# Patient Record
Sex: FEMALE
[De-identification: names, ages, dates, MRNs, and addresses within clinical notes are randomized per-mention and may not be internally consistent; named-entity substitution may affect disease eponyms.]

---

## 2019-01-12 ENCOUNTER — HOSPITAL ENCOUNTER (EMERGENCY)
Dept: HOSPITAL 88 - FSED | Age: 29
Discharge: HOME | End: 2019-01-12
Payer: COMMERCIAL

## 2019-01-12 VITALS — HEIGHT: 62 IN | WEIGHT: 150 LBS | BODY MASS INDEX: 27.6 KG/M2

## 2019-01-12 VITALS — DIASTOLIC BLOOD PRESSURE: 69 MMHG | SYSTOLIC BLOOD PRESSURE: 137 MMHG

## 2019-01-12 DIAGNOSIS — R11.2: ICD-10-CM

## 2019-01-12 DIAGNOSIS — K21.9: ICD-10-CM

## 2019-01-12 DIAGNOSIS — K29.00: ICD-10-CM

## 2019-01-12 DIAGNOSIS — R10.13: Primary | ICD-10-CM

## 2019-01-12 PROCEDURE — 80076 HEPATIC FUNCTION PANEL: CPT

## 2019-01-12 PROCEDURE — 81025 URINE PREGNANCY TEST: CPT

## 2019-01-12 PROCEDURE — 81003 URINALYSIS AUTO W/O SCOPE: CPT

## 2019-01-12 PROCEDURE — 99284 EMERGENCY DEPT VISIT MOD MDM: CPT

## 2019-01-12 PROCEDURE — 85025 COMPLETE CBC W/AUTO DIFF WBC: CPT

## 2019-01-12 PROCEDURE — 80048 BASIC METABOLIC PNL TOTAL CA: CPT

## 2019-01-12 NOTE — XMS REPORT
Clinical Summary

                             Created on: 2019



Kiah Woodward

External Reference #: [id number]

: 1990

Sex: Female



Demographics







                          Address                   [address] [place]  [address]

 

                          Home Phone                [phone]

 

                          Preferred Language        English

 

                          Marital Status            Unknown

 

                          Yazidism Affiliation     Anglican

 

                          Race                      Unknown

 

                          Ethnic Group              /Latin





Author







                          Author                    [facility]

 

                          Address                   Unknown

 

                          Phone                     Unavailable







Support







                Name            Relationship    Address         Phone

 

                    Kiah Woodward    ECON                [address] [place]  [address]                     [phone]







Care Team Providers







                    Care Team Member Name    Role                Phone

 

                    Sharpless           PCP                 [phone]







Allergies







                                        Comments



                 Active Allergy     Reactions       Severity        Noted Date 

 

                                        



DIARRHEA



                     [place]              Other (See          2016 



                                         Comments)   







Medications







                          End Date                  Status



              Medication     Sig          Dispensed     Refills      Start  



                                         Date  

 

                                                    Active



                 fexofenadine-pseudoephedr     1 tablet.       0                 



                           ine (ALLEGRA-D 24 HOUR)        6  



                                         180-240 mg per 24 hr      



                                         tablet      

 

                                                    Active



                 topiramate (TOPAMAX) 50     TAKE 1 TABLET      0                 



                     MG tablet           BY MOUTH 2          7  



                                         TIMES DAILY.     

 

                          2018                



              naproxen (EC NAPROSYN)     Take 1 tablet     20 tablet     0              



                     500 MG EC tablet     (500 mg             7  



                                         total) by     



                                         mouth 2 (two)     



                                         times daily     



                                         with     



                                         breakfast and     



                                         dinner.     







Active Problems







 



                           Problem                   Noted Date

 

 



                           Migraine                  2015

 

 



                           Right sided weakness      2015

 

 



                           Headache                  2015







Family History







   



                 Medical History     Relation        Name            Comments

 

   



                           Unremarkable              Father  

 

   



                           Unremarkable              Mother  









   



                 Relation        Name            Status          Comments

 

   



                                         Father   

 

   



                                         Mother   







Social History







                                        Date



                 Tobacco Use     Types           Packs/Day       Years Used 

 

                                         



                                         Never Smoker    









   



                 Alcohol Use     Drinks/Week     oz/Week         Comments

 

   



                                         No   









 



                           Sex Assigned at Birth     Date Recorded

 

 



                                         Not on file 









                                        Industry



                           Job Start Date            Occupation 

 

                                        Not on file



                           Not on file               Not on file 









                                        Travel End



                           Travel History            Travel Start 

 





                                         No recent travel history available.







Last Filed Vital Signs

Not on file



Plan of Treatment





Not on file



Results

Not on fileafter 2018



Insurance







     



            Payer      Benefit     Subscriber ID     Type       Phone      Address



                                         Plan /    



                                         Group    

 

     



                 [employer]     [employer]           xxxxxxxxxxx     HMO/POS  



                                         HMO/POS/OP    



                                         EN ACCESS    









     



            Guarantor Name     Account     Relation to     Date of     Phone      Billing Address



                     Type                Patient             Birth  

 

     



            Kiah Woodward     Personal/F     Self       1990     [phone]     [address]



                     naima               (Home)              [place] [address]







Advance Directives





For more information, please contact:



[facility]



[address]



[place] [address] [phone]









                          Date Inactivated          Comments



                           Code Status               Date Activated  

 

                          2015  4:35 PM        



                           Full Code                 2015  8:05 PM  









  



                           This code status was determined by:     Patient

## 2019-01-12 NOTE — XMS REPORT
Patient Summary Document

                             Created on: 2019



JEWELL ROMERO

External Reference #: [id number]

: 1990

Sex: Female



Demographics







                          Address                   [address] [place] [address]  [address]

 

                          Home Phone                [phone]

 

                          Preferred Language        Unknown

 

                          Marital Status            Unknown

 

                          Shinto Affiliation     Unknown

 

                          Race                      Unknown

 

                          Ethnic Group              Unknown





Author







                          Author                    [facility]

 

                          Address                   Unknown

 

                          Phone                     Unavailable







Care Team Providers







                    Care Team Member Name    Role                Phone

 

                    MARIUM FALCON    Unavailable         Unavailable







Problems

This patient has no known problems.



Allergies, Adverse Reactions, Alerts

This patient has no known allergies or adverse reactions.



Medications

This patient has no known medications.



Results







           Test Description    Test Time    Test Comments    Text Results    Atomic Results    Result

 Comments

 

                COMPREHENSIVE METABOLIC PANEL    2017 21:15:00                      

 

   

 

                TOTAL PROTEIN (BEAKER) (test code=770)    8.1 gm/dL       6.0-8.5          

 

                ALBUMIN (BEAKER) (test code=1145)    4.5 g/dL        3.5-5.0          

 

                ALKALINE PHOSPHATASE (BEAKER) (test code=346)    84 U/L                     

 

                BILIRUBIN TOTAL (BEAKER) (test code=377)    0.2 mg/dL       0.1-1.2          

 

                SODIUM (BEAKER) (test code=381)    142 meq/L       135-148          

 

                POTASSIUM (BEAKER) (test code=379)    4.1 meq/L       3.6-5.5          

 

                CHLORIDE (BEAKER) (test code=382)    102 meq/L                  

 

                CO2 (BEAKER) (test code=355)    25 meq/L        24-32            

 

                BLOOD UREA NITROGEN (BEAKER) (test code=354)    14 mg/dL        10-26            

 

                CREATININE (BEAKER) (test code=358)    0.58 mg/dL      0.50-1.20        

 

                GLUCOSE RANDOM (BEAKER) (test code=652)    91 mg/dL                   

 

                CALCIUM (BEAKER) (test code=697)    8.8 mg/dL       8.5-10.5         

 

                AST (SGOT) (BEAKER) (test code=353)    23 U/L          5-40             

 

                ALT (SGPT) (BEAKER) (test code=347)    17 U/L          5-50             

 

                EGFR (BEAKER) (test code=1092)    128 mL/min/1.73 sq m                    ESTIMATED GFR IS NOT AS

 ACCURATE AS CREATININE CLEARANCE IN PREDICTING GLOMERULAR FILTRATION RATE. 
ESTIMATED GFR IS NOT APPLICABLE FOR DIALYSIS PATIENTS.





[id number] 21:15:00* 





                Test Item       Value           Reference Range    Comments

 

                LIPASE (BEAKER) (test code=749)    168 U/L                    





CBC W/PLT COUNT & AUTO [id number] 21:08:00* 





                Test Item       Value           Reference Range    Comments

 

                WHITE BLOOD CELL COUNT (BEAKER) (test code=775)    12.8 10e3/ L    4.0-10.0         

 

                RED BLOOD CELL COUNT (BEAKER) (test code=761)    4.68 10e6/ L    4.00-5.00        

 

                HEMOGLOBIN (BEAKER) (test code=410)    14.0 g/dL       12.0-15.0        

 

                HEMATOCRIT (BEAKER) (test code=411)    41.9 %          36.0-45.0        

 

                MEAN CORPUSCULAR VOLUME (BEAKER) (test code=753)    89.6 fL         82.0-99.0        

 

                MEAN CORPUSCULAR HEMOGLOBIN (BEAKER) (test code=751)    29.9 pg         27.0-33.0        

 

                    MEAN CORPUSCULAR HEMOGLOBIN CONC (BEAKER) (test code=752)    33.3 g/dL           32.0-36.0 

                                         

 

                RED CELL DISTRIBUTION WIDTH (BEAKER) (test code=412)    11.9 %          10.3-14.2        

 

                PLATELET COUNT (BEAKER) (test code=756)    395 10e3/ L     150-430          

 

                MEAN PLATELET VOLUME (BEAKER) (test code=754)    6.9 fL          6.5-10.5         

 

                NEUTROPHILS RELATIVE PERCENT (BEAKER) (test code=429)    58 %                             

 

                LYMPHOCYTES RELATIVE PERCENT (BEAKER) (test code=430)    31 %                             

 

                MONOCYTES RELATIVE PERCENT (BEAKER) (test code=431)    8 %                              

 

                EOSINOPHILS RELATIVE PERCENT (BEAKER) (test code=432)    2 %                              

 

                BASOPHILS RELATIVE PERCENT (BEAKER) (test code=437)    1 %                              

 

                NEUTROPHILS ABSOLUTE COUNT (BEAKER) (test code=670)    7.36 10e3/ L    1.80-8.00        

 

                LYMPHOCYTES ABSOLUTE COUNT (BEAKER) (test code=414)    3.99 10e3/ L    1.48-4.50        

 

                MONOCYTES ABSOLUTE COUNT (BEAKER) (test code=415)    1.06 10e3/ L    0.00-1.30        

 

                EOSINOPHILS ABSOLUTE COUNT (BEAKER) (test code=416)    0.23 10e3/ L    0.00-0.50        

 

                BASOPHILS ABSOLUTE COUNT (BEAKER) (test code=417)    0.11 10e3/ L    0.00-0.20        





URINALYSIS W/ [id number] 20:52:00* 





                Test Item       Value           Reference Range    Comments

 

                COLOR (BEAKER) (test code=470)    Yellow                           

 

                CLARITY (BEAKER) (test code=469)    Clear                            

 

                SPECIFIC GRAVITY UA (BEAKER) (test code=468)    1.025           1.001-1.035      

 

                PH UA (BEAKER) (test code=467)    5.5             5.0-8.0          

 

                PROTEIN UA (BEAKER) (test code=464)    Negative        Negative         

 

                GLUCOSE UA (BEAKER) (test code=365)    Negative        Negative         

 

                KETONES UA (BEAKER) (test code=371)    Negative        Negative         

 

                BILIRUBIN UA (BEAKER) (test code=462)    Negative        Negative         

 

                BLOOD UA (BEAKER) (test code=461)    Moderate        Negative         

 

                NITRITE UA (BEAKER) (test code=465)    Negative        Negative         

 

                LEUKOCYTE ESTERASE UA (BEAKER) (test code=466)    Negative        Negative         

 

                UROBILINOGEN UA (BEAKER) (test code=463)    0.2 mg/dL       0.2-1.0          

 

                BACTERIA (BEAKER) (test eesa=167)    Many                             

 

                MUCUS (BEAKER) (test code=1574)    Moderate                         

 

                RBC UA-MANUAL (BEAKER) (test code=1659)    10-20 /HPF                       

 

                WBC UA-MANUAL (BEAKER) (test code=1661)    <5 /HPF                          

 

                SQUAMOUS EPITHELIAL MANUAL (BEAKER) (test code=1663)    5-10 /HPF                        

 

                SOURCE(BEAKER) (test code=2795)                                     





PREGNANCY SCREEN, [id number] 20:49:00* 





                Test Item       Value           Reference Range    Comments

 

                PREGNANCY TEST URINE (BEAKER) (test code=583)    Negative

## 2019-02-06 ENCOUNTER — HOSPITAL ENCOUNTER (EMERGENCY)
Dept: HOSPITAL 88 - FSED | Age: 29
Discharge: HOME | End: 2019-02-06
Payer: COMMERCIAL

## 2019-02-06 VITALS — SYSTOLIC BLOOD PRESSURE: 110 MMHG | DIASTOLIC BLOOD PRESSURE: 71 MMHG

## 2019-02-06 VITALS — BODY MASS INDEX: 27.6 KG/M2 | HEIGHT: 62 IN | WEIGHT: 150 LBS

## 2019-02-06 DIAGNOSIS — R11.0: ICD-10-CM

## 2019-02-06 DIAGNOSIS — G43.019: Primary | ICD-10-CM

## 2019-02-06 PROCEDURE — 99283 EMERGENCY DEPT VISIT LOW MDM: CPT

## 2019-02-06 PROCEDURE — 81025 URINE PREGNANCY TEST: CPT

## 2019-02-06 NOTE — XMS REPORT
Clinical Summary

                             Created on: 2019



Kiah Woodward

External Reference #: [id number]

: 1990

Sex: Female



Demographics







                          Address                   [address] [place]  [address]

 

                          Home Phone                [phone]

 

                          Preferred Language        English

 

                          Marital Status            Unknown

 

                          Muslim Affiliation     Anabaptism

 

                          Race                      Unknown

 

                          Ethnic Group              /Latin





Author







                          Author                    [facility]

 

                          Address                   Unknown

 

                          Phone                     Unavailable







Support







                Name            Relationship    Address         Phone

 

                    Kiah Woodward    EC[employer]                [address] [place]  [address]                     [phone]







Care Team Providers







                    Care Team Member Name    Role                Phone

 

                    Sharpless           PCP                 [phone]







Allergies







                                        Comments



                 Active Allergy     Reactions       Severity        Noted Date 

 

                                        



DIARRHEA



                     [place]              Other (See          2016 



                                         Comments)   







Medications







                          End Date                  Status



              Medication     Sig          Dispensed     Refills      Start  



                                         Date  

 

                                                    Active



                 fexofenadine-pseudoephedr     1 tablet.       0                 



                           ine (ALLEGRA-D 24 HOUR)        6  



                                         180-240 mg per 24 hr      



                                         tablet      

 

                                                    Active



                 topiramate (TOPAMAX) 50     TAKE 1 TABLET      0                 



                     MG tablet           BY MOUTH 2          7  



                                         TIMES DAILY.     

 

                          2018                



              naproxen (EC NAPROSYN)     Take 1 tablet     20 tablet     0              



                     500 MG EC tablet     (500 mg             7  



                                         total) by     



                                         mouth 2 (two)     



                                         times daily     



                                         with     



                                         breakfast and     



                                         dinner.     







Active Problems







 



                           Problem                   Noted Date

 

 



                           Migraine                  2015

 

 



                           Right sided weakness      2015

 

 



                           Headache                  2015







Family History







   



                 Medical History     Relation        Name            Comments

 

   



                           Unremarkable              Father  

 

   



                           Unremarkable              Mother  









   



                 Relation        Name            Status          Comments

 

   



                                         Father   

 

   



                                         Mother   







Social History







                                        Date



                 Tobacco Use     Types           Packs/Day       Years Used 

 

                                         



                                         Never Smoker    









   



                 Alcohol Use     Drinks/Week     oz/Week         Comments

 

   



                                         No   









 



                           Sex Assigned at Birth     Date Recorded

 

 



                                         Not on file 









                                        Industry



                           Job Start Date            Occupation 

 

                                        Not on file



                           Not on file               Not on file 









                                        Travel End



                           Travel History            Travel Start 

 





                                         No recent travel history available.







Last Filed Vital Signs

Not on file



Plan of Treatment





Not on file



Results

Not on fileafter 2018



Insurance







     



            Payer      Benefit     Subscriber ID     Type       Phone      Address



                                         Plan /    



                                         Group    

 

     



                 [employer]     [employer]           xxxxxxxxxxx     HMO/POS  



                                         HMO/POS/OP    



                                         EN ACCESS    









     



            Guarantor Name     Account     Relation to     Date of     Phone      Billing Address



                     Type                Patient             Birth  

 

     



            Kiah Woodward     Personal/F     Self       1990     [phone]     [address]



                     [address]               (Home)              [place] [address]







Advance Directives





For more information, please contact:



[facility]



[address]



[place] [address] [phone]









                          Date Inactivated          Comments



                           Code Status               Date Activated  

 

                          2015  4:35 PM        



                           Full Code                 2015  8:05 PM  









  



                           This code status was determined by:     Patient

## 2019-04-20 ENCOUNTER — HOSPITAL ENCOUNTER (EMERGENCY)
Dept: HOSPITAL 88 - FSED | Age: 29
Discharge: HOME | End: 2019-04-20
Payer: COMMERCIAL

## 2019-04-20 VITALS — BODY MASS INDEX: 27.6 KG/M2 | WEIGHT: 150 LBS | HEIGHT: 62 IN

## 2019-04-20 DIAGNOSIS — T15.11XA: Primary | ICD-10-CM

## 2019-04-20 PROCEDURE — 99284 EMERGENCY DEPT VISIT MOD MDM: CPT

## 2019-04-20 NOTE — XMS REPORT
Clinical Summary

                             Created on: 2019



Kiah Woodward

External Reference #: [id number]

: 1990

Sex: Female



Demographics







                          Address                   [address] [place]  [address]

 

                          Home Phone                [phone]

 

                          Preferred Language        English

 

                          Marital Status            Unknown

 

                          Christianity Affiliation     Oriental orthodox

 

                          Race                      Unknown

 

                          Ethnic Group              /Latin





Author







                          Author                    [facility]

 

                          Address                   Unknown

 

                          Phone                     Unavailable







Support







                Name            Relationship    Address         Phone

 

                    Kiah Woodward    ECON                [address]

[place]  [address]                     [phone]







Care Team Providers







                    Care Team Member Name    Role                Phone

 

                    Sharpless           PCP                 [phone]







Allergies







                                        Comments



                 Active Allergy     Reactions       Severity        Noted Date 

 

                                        



DIARRHEA



                     [place]              Other (See          2016 



                                         Comments)   







Medications







                          End Date                  Status



              Medication     Sig          Dispensed     Refills      Start  



                                         Date  

 

                                                    Active



                 fexofenadine-pseudoephedr     1 tablet.       0                 



                           ine (ALLEGRA-D 24 HOUR)        6  



                                         180-240 mg per 24 hr      



                                         tablet      

 

                                                    Active



                 topiramate (TOPAMAX) 50     TAKE 1 TABLET      0                 



                     MG tablet           BY MOUTH 2          7  



                                         TIMES DAILY.     







Active Problems







 



                           Problem                   Noted Date

 

 



                           Migraine                  2015

 

 



                           Right sided weakness      2015

 

 



                           Headache                  2015







Family History







   



                 Medical History     Relation        Name            Comments

 

   



                           Unremarkable              Father  

 

   



                           Unremarkable              Mother  









   



                 Relation        Name            Status          Comments

 

   



                                         Father   

 

   



                                         Mother   







Social History







                                        Date



                 Tobacco Use     Types           Packs/Day       Years Used 

 

                                         



                                         Never Smoker    









   



                 Alcohol Use     Drinks/Week     oz/Week         Comments

 

   



                                         No   









 



                           Sex Assigned at Birth     Date Recorded

 

 



                                         Not on file 









                                        Industry



                           Job Start Date            Occupation 

 

                                        Not on file



                           Not on file               Not on file 









                                        Travel End



                           Travel History            Travel Start 

 





                                         No recent travel history available.







Last Filed Vital Signs

Not on file



Plan of Treatment





Not on file



Results

Not on fileafter 2018



Insurance







     



            Payer      Benefit     Subscriber ID     Type       Phone      Address



                                         Plan /    



                                         Group    

 

     



                 [employer]     [employer]           xxxxxxxxxxx     HMO/POS  



                                         HMO/POS/OP    



                                         EN ACCESS    









     



            Guarantor Name     Account     Relation to     Date of     Phone      Billing Address



                     Type                Patient             Birth  

 

     



            Kiah Woodward     Personal/F     Self       1990     [phone]     [address] [place]               (Home)              [place] [address]







Advance Directives





For more information, please contact:



[place] [id number] [facility] [place] [address]



[phone]









                          Date Inactivated          Comments



                           Code Status               Date Activated  

 

                          2015  4:35 PM        



                           Full Code                 2015  8:05 PM  









  



                           This code status was determined by:     Patient

## 2020-08-30 ENCOUNTER — HOSPITAL ENCOUNTER (EMERGENCY)
Dept: HOSPITAL 88 - FSED | Age: 30
Discharge: HOME | End: 2020-08-30
Payer: SELF-PAY

## 2020-08-30 VITALS — BODY MASS INDEX: 30.12 KG/M2 | WEIGHT: 170 LBS | HEIGHT: 63 IN

## 2020-08-30 DIAGNOSIS — F17.210: ICD-10-CM

## 2020-08-30 DIAGNOSIS — G43.909: Primary | ICD-10-CM

## 2020-08-30 DIAGNOSIS — K21.9: ICD-10-CM

## 2020-08-30 PROCEDURE — 85025 COMPLETE CBC W/AUTO DIFF WBC: CPT

## 2020-08-30 PROCEDURE — 80076 HEPATIC FUNCTION PANEL: CPT

## 2020-08-30 PROCEDURE — 93005 ELECTROCARDIOGRAM TRACING: CPT

## 2020-08-30 PROCEDURE — 80048 BASIC METABOLIC PNL TOTAL CA: CPT

## 2020-08-30 PROCEDURE — 81025 URINE PREGNANCY TEST: CPT

## 2020-08-30 PROCEDURE — 99283 EMERGENCY DEPT VISIT LOW MDM: CPT

## 2020-08-30 NOTE — XMS REPORT
Continuity of Care Document

                             Created on: 2020



JEWELL WOODWARD

External Reference #: [id number]

: 1990

Sex: Female



Demographics





                          Address                   [address] [place] [address]  [address]

 

                          Home Phone                [phone]

 

                          Preferred Language        English

 

                          Marital Status            Unknown

 

                          Jewish Affiliation     Holiness

 

                          Race                      Unknown

 

                          Additional Race(s)        Other





 

                          Ethnic Group              Unknown





Author





                          Author                    [facility]

t

 

                          Organization              [facility]

t

 

                          Address                   [address]

[place]  [address]



 

                          Phone                     Unavailable







Support





                Name            Relationship    Address         Phone

 

                    Waleska Woodward [employer]                [address] [place]  [address]                     [phone]







Care Team Providers





                    Care Team Member Name Role                Phone

 

                    NO,  PCP            PCP                 Unavailable

 

                    MARIUM FALCON Attharika             Unavailable







Payers





           Payer Name Policy Type Policy Number Effective Date Expiration Date [employer]ENNY [employer]use[employer]            [id number]                       [facility]            [id number]                        [facility]            [id number]                        [facility]







Problems





           Condition Name Condition Details Condition Category Status     Onset 

Date Resolution

Date            Last Treatment Date Treating Clinician Comments        Source

 

       Migraine Migraine Disease Active 2015 00:00:00                     

        [facility]

 

          Right sided weakness Right sided weakness Disease   Active     00:00:00            

                                                            [facility]

 

       Headache Headache Disease Active 2015 00:00:00                     

        [facility]







Allergies, Adverse Reactions, Alerts





        Allergy Name Allergy Type Status  Severity Reaction(s) Onset Date Inacti

ve Date 

Treating Clinician        Comments                  Source

 

           Cache     Propensity to adverse reactions Active                [facility]

r (See Comments) 2016 

00:00:00                                        DIARRHEA        [facility]







Family History





           Family Member Diagnosis  Comments   Start Date Stop Date  Source

 

           Natural father Unremarkable                                  [facility]

 

           Natural mother Unremarkable                                  [facility]







Social History





           Social Habit Start Date Stop Date  Quantity   Comments   Source

 

           Sex Assigned At Birth                                             [facility]







                Smoking Status  Start Date      Stop Date       Source

 

                Never smoker                                    [facility]







Medications





             Ordered Medication Name Filled Medication Name Start Date   Stop Da

te    Current 

Medication? Ordering Clinician Indication Dosage     Frequency  Signature (SIG) 

Comments                  Components                Source

 

       topiramate (TOPAMAX) 50 MG tablet        2017 00:00:00        Yes  

                              TAKE 1 

TABLET BY MOUTH 2 TIMES DAILY.                                         [facility]

 

                    fexofenadine-pseudoephedrine (ALLEGRA-D 24 HOUR) 180-240 mg 

per 24 hr tablet                     

2016 00:00:00        Yes                  1{tbl}        1 tablet.         

      [facility]







Procedures

This patient has no known procedures.



Encounters





             Start Date/Time End Date[facility]Time [facility] Encounter ID    Source

 

          2019 06:09:00 2019 07:00:00 Departed Emergency Room       

              [facility]                    [id number]              [facility]

 

          2019 19:47:00 2019 21:48:00 Departed Emergency Room       

              [facility]                    [id number]              [facility]

 

          2019 15:19:00 2019 19:01:00 Departed Emergency Room       

              [facility]                    [id number]              [facility]







Results





           Test Description Test Time  Test Comments Results    Result Comments 

Source

 

                    COMPREHENSIVE METABOLIC PANEL 2017 21:15:00   

 

                                        Test Item

 

             TOTAL PROTEIN (BEAKER) (test code = 770) 8.1 gm/dL    6.0-8.5      

              

 

             ALBUMIN (BEAKER) (test code = 1145) 4.5 g/dL     3.5-5.0           

         

 

             ALKALINE PHOSPHATASE (BEAKER) (test code = 346) 84 U/L       

                     

 

             BILIRUBIN TOTAL (BEAKER) (test code = 377) 0.2 mg/dL    0.1-1.2    

                

 

             SODIUM (BEAKER) (test code = 381) 142 meq/L    135-148             

       

 

             POTASSIUM (BEAKER) (test code = 379) 4.1 meq/L    3.6-5.5          

          

 

             CHLORIDE (BEAKER) (test code = 382) 102 meq/L                

         

 

             CO2 (BEAKER) (test code = 355) 25 meq/L     24-32                  

    

 

             BLOOD UREA NITROGEN (BEAKER) (test code = 354) 14 mg/dL     10-26  

                    

 

             CREATININE (BEAKER) (test code = 358) 0.58 mg/dL   0.50-1.20       

           

 

             GLUCOSE RANDOM (BEAKER) (test code = 652) 91 mg/dL           

               

 

             CALCIUM (BEAKER) (test code = 697) 8.8 mg/dL    8.5-10.5           

        

 

             AST (SGOT) (BEAKER) (test code = 353) 23 U/L       5-40            

           

 

             ALT (SGPT) (BEAKER) (test code = 347) 17 U/L       5-50            

           

 

             EGFR (BEAKER) (test code = 1092) 128 mL/min/1.73 sq m              

             ESTIMATED GFR IS NOT 

AS ACCURATE AS CREATININE CLEARANCE IN PREDICTING GLOMERULAR FILTRATION RATE. 
ESTIMATED GFR IS NOT APPLICABLE FOR DIALYSIS PATIENTS.





[id number] 21:15:00* 



             Test Item    Value        Reference Range Interpretation Comments

 

             LIPASE (BEAKER) (test code = 749) 168 U/L                    

       





CBC W/PLT COUNT & AUTO [id number] 21:08:00* 



             Test Item    Value        Reference Range Interpretation Comments

 

             WHITE BLOOD CELL COUNT (BEAKER) (test code = 775) 12.8 10e3/ L 4.0-

10.0     H             

 

             RED BLOOD CELL COUNT (BEAKER) (test code = 761) 4.68 10e6/ L 4.00-5

.00                  

 

             HEMOGLOBIN (BEAKER) (test code = 410) 14.0 g/dL    12.0-15.0       

           

 

             HEMATOCRIT (BEAKER) (test code = 411) 41.9 %       36.0-45.0       

           

 

             MEAN CORPUSCULAR VOLUME (BEAKER) (test code = 753) 89.6 fL      82.

0-99.0                  

 

             MEAN CORPUSCULAR HEMOGLOBIN (BEAKER) (test code = 751) 29.9 pg     

 27.0-33.0                  

 

                MEAN CORPUSCULAR HEMOGLOBIN CONC (BEAKER) (test code = 752) 33.3

 g/dL       32.0-36.0       

                                         

 

             RED CELL DISTRIBUTION WIDTH (BEAKER) (test code = 412) 11.9 %      

 10.3-14.2                  

 

             PLATELET COUNT (BEAKER) (test code = 756) 395 10e3/ L  150-430     

               

 

             MEAN PLATELET VOLUME (BEAKER) (test code = 754) 6.9 fL       6.5-10

.5                   

 

             NEUTROPHILS RELATIVE PERCENT (BEAKER) (test code = 429) 58 %       

                             

 

             LYMPHOCYTES RELATIVE PERCENT (BEAKER) (test code = 430) 31 %       

                             

 

             MONOCYTES RELATIVE PERCENT (BEAKER) (test code = 431) 8 %          

                           

 

             EOSINOPHILS RELATIVE PERCENT (BEAKER) (test code = 432) 2 %        

                             

 

             BASOPHILS RELATIVE PERCENT (BEAKER) (test code = 437) 1 %          

                           

 

             NEUTROPHILS ABSOLUTE COUNT (BEAKER) (test code = 670) 7.36 10e3/ L 

1.80-8.00                  

 

             LYMPHOCYTES ABSOLUTE COUNT (BEAKER) (test code = 414) 3.99 10e3/ L 

1.48-4.50                  

 

             MONOCYTES ABSOLUTE COUNT (BEAKER) (test code = 415) 1.06 10e3/ L 0.

00-1.30                  

 

             EOSINOPHILS ABSOLUTE COUNT (BEAKER) (test code = 416) 0.23 10e3/ L 

0.00-0.50                  

 

             BASOPHILS ABSOLUTE COUNT (BEAKER) (test code = 417) 0.11 10e3/ L 0.

00-0.20                  





URINALYSIS W/ [id number] 20:52:00* 



             Test Item    Value        Reference Range Interpretation Comments

 

             COLOR (BEAKER) (test code = 470) Yellow                            

      

 

             CLARITY (BEAKER) (test code = 469) Clear                           

        

 

             SPECIFIC GRAVITY UA (BEAKER) (test code = 468) 1.025        1.001-1

.035                

 

             PH UA (BEAKER) (test code = 467) 5.5          5.0-8.0              

      

 

             PROTEIN UA (BEAKER) (test code = 464) Negative     Negative        

           

 

             GLUCOSE UA (BEAKER) (test code = 365) Negative     Negative        

           

 

             KETONES UA (BEAKER) (test code = 371) Negative     Negative        

           

 

             BILIRUBIN UA (BEAKER) (test code = 462) Negative     Negative      

             

 

             BLOOD UA (BEAKER) (test code = 461) Moderate     Negative     A    

         

 

             NITRITE UA (BEAKER) (test code = 465) Negative     Negative        

           

 

             LEUKOCYTE ESTERASE UA (BEAKER) (test code = 466) Negative     Negat

arin                   

 

             UROBILINOGEN UA (BEAKER) (test code = 463) 0.2 mg/dL    0.2-1.0    

                

 

             BACTERIA (BEAKER) (test code = 517) Many                           

         

 

             MUCUS (BEAKER) (test code = 1574) Moderate                         

       

 

             RBC UA-MANUAL (BEAKER) (test code = 1659) 10-20 /HPF               

               

 

             WBC UA-MANUAL (BEAKER) (test code = 1661) <5 /HPF                  

               

 

             SQUAMOUS EPITHELIAL MANUAL (BEAKER) (test code = 1663) 5-10 /HPF   

                            

 

             SOURCE(BEAKER) (test code = 2795)                                  

       





PREGNANCY SCREEN, [id number] 20:49:00* 



             Test Item    Value        Reference Range Interpretation Comments

 

             PREGNANCY TEST URINE (BEAKER) (test code = 583) Negative

## 2020-08-30 NOTE — EMERGENCY DEPARTMENT NOTE
History of Present Illnes


History of Present Illness


Chief Complaint:  General Medicine Complaints


History of Present Illness


This is a 30 year old  female Chief Complaint Comment HEADACHE FOR 6 

DAYS, ALSO HAS NAUSEA AND VOMITING FOR 3 DAYS.  NAD NOTED.  PT AMBULATORY TO 

TRIAGE, URINE OBTAINED 


 .


Historian:  Patient


Arrival Mode:  Car


Onset (how long ago):  day(s) (2)


Location:  vomiting


Quality:  dull headache


Radiation:  Denies non-radiation, Denies back, Denies neck, Denies extremity, 

Denies abdomen, Denies periumbilical, Denies flank, Denies proximal, Denies 

distal, Denies other


Severity:  moderate


Onset quality:  gradual


Duration (how long):  day(s) (2)


Timing of current episode:  intermittent


Progression:  waxing and waning


Chronicity:  new


Context:  Denies recent illness, Denies recent surgery, Denies recent 

immobilization, Denies recent travel, Denies trauma/injury, Denies new 

medications, Denies hx of DVT/PE, Denies non-compliance w/ medications, Denies 

other


Relieving factors:  none


Exacerbating factors:  none


Associated symptoms:  Reports denies other symptoms


Treatments prior to arrival:  none





Past Medical/Family History


Physician Review


I have reviewed the patient's past medical and family history.  Any updates have

been documented here.





Past Medical History


Recent Fever:  No


Clinical Suspicion of Infectio:  No


New/Unexplained Change in Ment:  No


Past Medical History:  Migraines, GERD


Past Surgical History:  None





Social History


Smoking Cessation:  Current every day smoker


Counseling Performed:  No


Alcohol Use:  None


Any Illegal Drug Use:  No





Other


Last Tetanus:  UTD


Any Pre-Existing Lines (PICC,:  No





Review of Systems


Review of Systems


Constitutional:  Reports no symptoms


EENTM:  Reports no symptoms


Cardiovascular:  Reports no symptoms


Respiratory:  Reports no symptoms


Gastrointestinal:  Reports no symptoms


Genitourinary:  Reports no symptoms


Musculoskeletal:  Reports no symptoms


Integumentary:  Reports no symptoms


Neurological:  Reports no symptoms


Psychological:  Reports no symptoms


Endocrine:  Reports no symptoms


Hematological/Lymphatic:  Reports no symptoms





Physical Exam


Related Data


Allergies:  


Coded Allergies:  


     No Known Allergies (Unverified , 4/24/14)


Triage Vital Signs





Vital Signs








  Date Time  Temp Pulse Resp B/P (MAP) Pulse Ox O2 Delivery O2 Flow Rate FiO2


 


8/30/20 17:07 98.3 92 18 120/70 98   








Vital signs reviewed:  Yes





Physical Exam


CONSTITUTIONAL





Constitutional:  Present well-developed, Present well-nourished


HENT


HENT:  Present normocephalic, Present atraumatic, Present oropharynx 

clear/moist, Present nose normal


HENT L/R:  Present left ext ear normal, Present right ext ear normal


EYES





Eyes:  Reports PERRL, Reports conjunctivae normal


NECK


Neck:  Present ROM normal


PULMONARY


Pulmonary:  Present effort normal, Present breath sounds normal


CARDIOVASCULAR





Cardiovascular:  Present regular rhythm, Present heart sounds normal, Present 

capillary refill normal, Present normal rate


GASTROINTESTINAL





Abdominal:  Present soft, Present nontender, Present bowel sounds normal


GENITOURINARY





Genitourinary:  Present exam deferred


SKIN


Skin:  Present warm, Present dry


MUSCULOSKELETAL





Musculoskeletal:  Present ROM normal


NEUROLOGICAL





Neurological:  Present alert, Present oriented x 3, Present no gross motor or 

sensory deficits


PSYCHOLOGICAL


Psychological:  Present mood/affect normal, Present judgement normal





Results


Laboratory


Lab results reviewed:  Yes





Procedures


12 Lead ECG Interpretation


ECG Interpretation :  


   ECG:  ECG 1


   :  Interpreted by ED physician


   Date:  Aug 30, 2020


   Time:  16:58


   Rhythm:  sinus rhythm


   Rate:  normal


   BPM:  91


   QRS axis:  normal


   ST segments normal:  Yes


   T waves normal:  Yes





Assessment & Plan


Medical Decision Making


MDM


migrain,,,gastritis





Reassessment


Reassessment


better





Assessment & Plan


Final Impression:  


(1) Headache


(2) Vomiting


(3) Migraine


Last Vital Signs











  Date Time  Temp Pulse Resp B/P (MAP) Pulse Ox O2 Delivery O2 Flow Rate FiO2


 


8/30/20 17:07 98.3 92 18 120/70 98   








Home Meds


No Active Prescriptions or Reported Meds


Medications in the ED





Ondansetron HCl 4 mg NOW  STAT IV  Last administered on 8/30/20at 17:25; Admin 

Dose 4 MG;  Start 8/30/20 at 17:08;  Stop 8/30/20 at 17:26;  Status DC


Ondansetron HCl 4 mg STK-MED ONCE .ROUTE ;  Start 8/30/20 at 17:30;  Stop 

8/30/20 at 17:26;  Status DC











NIK BREWER MD             Aug 30, 2020 17:43

## 2020-08-30 NOTE — XMS REPORT
Clinical Summary

                             Created on: 2020



Kiah Woodward

External Reference #: [id number]

: 1990

Sex: Female



Demographics





                          Address                   [address]

[place]  [address]

 

                          Home Phone                [phone]

 

                          Preferred Language        English

 

                          Marital Status            Unknown

 

                          Protestant Affiliation     Latter-day

 

                          Race                      Unknown

 

                          Ethnic Group              /Latin





Author





                          Author                    [facility]

 

                          Address                   Unknown

 

                          Phone                     Unavailable







Support





                Name            Relationship    Address         Phone

 

                    Kiah Woodward ECON                [address]

[place]  [address]                     [phone]







Care Team Providers





                    Care Team Member Name Role                Phone

 

                    Sharpless           PCP                 [phone]







Allergies





                                        Comments



                 Active Allergy  Reactions       Severity        Noted Date 

 

                                        



DIARRHEA



                     [place]              Other (See          2016 



                                         Comments)   







Medications





                          End Date                  Status



              Medication   Sig          Dispensed    Refills      Start  



                                         Date  

 

                                                    Active



                 fexofenadine-pseudoephedr  1 tablet.       0                 



                           ine (ALLEGRA-D 24 HOUR)     6  



                                         180-240 mg per 24 hr      



                                         tablet      

 

                                                    Active



                 topiramate (TOPAMAX) 50  TAKE 1 TABLET   0                 



                     MG tablet           BY MOUTH 2          7  



                                         TIMES DAILY.     







Active Problems





 



                           Problem                   Noted Date

 

 



                           Migraine                  2015

 

 



                           Right sided weakness      2015

 

 



                           Headache                  2015







Family History





   



                 Medical History  Relation        Name            Comments

 

   



                           Unremarkable              Father  

 

   



                           Unremarkable              Mother  







   



                 Relation        Name            Status          Comments

 

   



                                         Father   

 

   



                                         Mother   







Social History





                                        Date



                 Tobacco Use     Types           Packs/Day       Years Used 

 

                                         



                                         Never Smoker    







   



                 Alcohol Use     Drinks/Week     oz/Week         Comments

 

   



                                         No   







 



                           Sex Assigned at Birth     Date Recorded

 

 



                                         Not on file 







                                        Industry



                           Job Start Date            Occupation 

 

                                        Not on file



                           Not on file               Not on file 







                                        Travel End



                           Travel History            Travel Start 

 





                                         No recent travel history available.







Last Filed Vital Signs

Not on file



Plan of Treatment





Not on file



Results

Not on fileafter 2019



Insurance





     



            Payer      Benefit    Subscriber ID  Type       Phone      Address



                                         Plan /    



                                         Group    

 

     



                 [employer]  [employer]           xxxxxxxxxxx     HMO/POS  



                                         HMO/POS/OP    



                                         EN ACCESS    







     



            Guarantor Name  Account    Relation to  Date of    Phone      Philipin

g Address



                     Type                Patient             Birth  

 

     



            Kiah Woodward  Personal/F  Self       1990  [phone]  [address] [place]               (Home)              [place] [address]







Advance Directives





For more information, please contact:



[facility]



[address]



[place], [place] [address] [phone]







                          Date Inactivated          Comments



                           Code Status               Date Activated  

 

                          2015  4:35 PM        



                           Full Code                 2015  8:05 PM  







  



                           This code status was determined by:  Patient

## 2021-01-11 ENCOUNTER — HOSPITAL ENCOUNTER (EMERGENCY)
Dept: HOSPITAL 88 - FSED | Age: 31
Discharge: HOME | End: 2021-01-11
Payer: SELF-PAY

## 2021-01-11 VITALS — HEIGHT: 63 IN | WEIGHT: 160 LBS | BODY MASS INDEX: 28.35 KG/M2

## 2021-01-11 DIAGNOSIS — R06.02: ICD-10-CM

## 2021-01-11 DIAGNOSIS — R07.89: ICD-10-CM

## 2021-01-11 DIAGNOSIS — K21.9: ICD-10-CM

## 2021-01-11 DIAGNOSIS — R00.2: Primary | ICD-10-CM

## 2021-01-11 PROCEDURE — 80048 BASIC METABOLIC PNL TOTAL CA: CPT

## 2021-01-11 PROCEDURE — 84484 ASSAY OF TROPONIN QUANT: CPT

## 2021-01-11 PROCEDURE — 85379 FIBRIN DEGRADATION QUANT: CPT

## 2021-01-11 PROCEDURE — 99283 EMERGENCY DEPT VISIT LOW MDM: CPT

## 2021-01-11 PROCEDURE — 71046 X-RAY EXAM CHEST 2 VIEWS: CPT

## 2021-01-11 PROCEDURE — 82553 CREATINE MB FRACTION: CPT

## 2021-01-11 PROCEDURE — 93005 ELECTROCARDIOGRAM TRACING: CPT

## 2021-01-11 PROCEDURE — 85025 COMPLETE CBC W/AUTO DIFF WBC: CPT
